# Patient Record
Sex: FEMALE | URBAN - METROPOLITAN AREA
[De-identification: names, ages, dates, MRNs, and addresses within clinical notes are randomized per-mention and may not be internally consistent; named-entity substitution may affect disease eponyms.]

---

## 2019-11-08 ENCOUNTER — TELEPHONE (OUTPATIENT)
Dept: PERINATAL CARE | Facility: CLINIC | Age: 34
End: 2019-11-08

## 2019-11-08 ENCOUNTER — TRANSCRIBE ORDERS (OUTPATIENT)
Dept: PERINATAL CARE | Facility: CLINIC | Age: 34
End: 2019-11-08

## 2019-11-08 DIAGNOSIS — O09.899 SUPERVISION OF OTHER HIGH RISK PREGNANCIES, UNSPECIFIED TRIMESTER: Primary | ICD-10-CM

## 2019-12-06 ENCOUNTER — ROUTINE PRENATAL (OUTPATIENT)
Dept: PERINATAL CARE | Facility: CLINIC | Age: 34
End: 2019-12-06
Payer: COMMERCIAL

## 2019-12-06 VITALS
DIASTOLIC BLOOD PRESSURE: 82 MMHG | BODY MASS INDEX: 43.07 KG/M2 | HEIGHT: 66 IN | WEIGHT: 268 LBS | SYSTOLIC BLOOD PRESSURE: 119 MMHG | HEART RATE: 77 BPM

## 2019-12-06 DIAGNOSIS — Z36.82 NUCHAL TRANSLUCENCY OF FETUS ON PRENATAL ULTRASOUND: ICD-10-CM

## 2019-12-06 DIAGNOSIS — O99.211 OBESITY AFFECTING PREGNANCY IN FIRST TRIMESTER: Primary | ICD-10-CM

## 2019-12-06 DIAGNOSIS — O09.899 SUPERVISION OF OTHER HIGH RISK PREGNANCIES, UNSPECIFIED TRIMESTER: ICD-10-CM

## 2019-12-06 DIAGNOSIS — Z3A.12 12 WEEKS GESTATION OF PREGNANCY: ICD-10-CM

## 2019-12-06 DIAGNOSIS — O09.291 PRIOR FETAL MACROSOMIA IN FIRST TRIMESTER, ANTEPARTUM: ICD-10-CM

## 2019-12-06 PROCEDURE — 99241 PR OFFICE CONSULTATION NEW/ESTAB PATIENT 15 MIN: CPT | Performed by: OBSTETRICS & GYNECOLOGY

## 2019-12-06 PROCEDURE — 76801 OB US < 14 WKS SINGLE FETUS: CPT | Performed by: OBSTETRICS & GYNECOLOGY

## 2019-12-06 PROCEDURE — 76813 OB US NUCHAL MEAS 1 GEST: CPT | Performed by: OBSTETRICS & GYNECOLOGY

## 2019-12-06 RX ORDER — DOXYCYCLINE HYCLATE 50 MG/1
324 CAPSULE, GELATIN COATED ORAL
COMMUNITY

## 2019-12-06 RX ORDER — ASPIRIN 81 MG/1
162 TABLET, CHEWABLE ORAL DAILY
Qty: 60 TABLET | Refills: 6
Start: 2019-12-06

## 2019-12-06 NOTE — PROGRESS NOTES
Patient is here today for an ultrasound for sequential screen  Risk factors include prior macrosomic baby and BMI greater than 40  Her ultrasound today appear normal   Sequential screen was drawn  We reviewed the risks of obesity in pregnancy  Recommend starting her on baby aspirin x2 to decrease her risk for preeclampsia  Recommend an early diabetes screen  Recommend a 28 and 34 week ultrasound for fetal growth which can be completed locally  Recommend a 20 week ultrasound for anatomy which can be completed locally  Recommend offering weekly NST/fluid scans from 36 weeks on as her BMI of greater than 40 increases her risk for stillbirth at term  Please see her ultrasound report in a separate report    Norm Berry MD

## 2019-12-06 NOTE — LETTER
December 9, 2019     Kendrick Headley DO  [address]    Patient: Brie Solomon   YOB: 1985   Date of Visit: 12/6/2019       Dear Dr Christian Melara: Thank you for referring Asaabdiel Hamman to me for evaluation  Below are my notes for this consultation  If you have questions, please do not hesitate to call me  I look forward to following your patient along with you  Sincerely,        Mary Mcgowan MD        CC: No Recipients  Mary Mcgowan MD  12/6/2019 11:51 AM  Sign at close encounter    Please see her ultrasound report in a separate report    Mary Mcgowan MD

## 2019-12-09 PROBLEM — O09.291 PRIOR FETAL MACROSOMIA IN FIRST TRIMESTER, ANTEPARTUM: Status: ACTIVE | Noted: 2019-12-09

## 2019-12-13 ENCOUNTER — DOCUMENTATION (OUTPATIENT)
Dept: PERINATAL CARE | Facility: CLINIC | Age: 34
End: 2019-12-13

## 2019-12-13 NOTE — PROGRESS NOTES
The patient viewed the message sent through [employer] by Dr Gisella Dinh that explained the results and part 2 instructions  TRF mailed to patient

## 2019-12-13 NOTE — LETTER
12/13/19  Xenia López Mt  1985    Thank you for completing Part 1 of your Sequential Screen  To obtain a complete test result, please complete blood work for Part 2 Sequential Screen between the weeks of 12/29/19 to 01/12/20  Based on your insurance coverage, please use one of the following locations  Call our office for any questions at [phone]      [address] [place] [address] Avenue  1492 [facility], [facility], [address]    300 [facility], [facility] [address]       Phone: [phone]      Phone: [phone]    [facility] 82  [facility] 6 [facility], 0 [facility], [address] [place] [address]  Phone: [phone]      Phone: [phone] (*ask for lab)    [address], [facility], [address] [place] [address], [place] [address] [address]  Phone: [phone]      Phone:  [phone]  Hours: Monday-Friday 6a-6p, Saturday 7a-12    [address]  [address]  Phone: [phone]      Phone:  793 [facility],[address] [facility]  207 [facility], [facility], [address]   [address]  Phone: [phone]      Phone: [phone]      Sincerely,    Freddie Avila RN

## 2020-01-10 ENCOUNTER — TELEPHONE (OUTPATIENT)
Dept: PERINATAL CARE | Facility: OTHER | Age: 35
End: 2020-01-10

## 2020-01-10 NOTE — TELEPHONE ENCOUNTER
Left message with result with phone number to call back at the number provided on communication consent

## 2020-01-10 NOTE — TELEPHONE ENCOUNTER
----- Message from Tricia Qureshi MD sent at 1/9/2020  9:18 PM EST -----  Patient updated with her lab results through my chart

## 2020-05-28 PROCEDURE — 87653 STREP B DNA AMP PROBE: CPT | Performed by: OBSTETRICS & GYNECOLOGY

## 2020-05-29 ENCOUNTER — LAB REQUISITION (OUTPATIENT)
Dept: LAB | Facility: HOSPITAL | Age: 35
End: 2020-05-29
Payer: COMMERCIAL

## 2020-05-29 DIAGNOSIS — O09.893 SUPERVISION OF OTHER HIGH RISK PREGNANCIES, THIRD TRIMESTER: ICD-10-CM

## 2020-05-31 LAB — GP B STREP DNA SPEC QL NAA+PROBE: NORMAL

## 2020-06-20 ENCOUNTER — ANESTHESIA (INPATIENT)
Dept: ANESTHESIOLOGY | Facility: HOSPITAL | Age: 35
End: 2020-06-20
Payer: COMMERCIAL

## 2020-06-20 ENCOUNTER — ANESTHESIA EVENT (INPATIENT)
Dept: ANESTHESIOLOGY | Facility: HOSPITAL | Age: 35
End: 2020-06-20
Payer: COMMERCIAL

## 2020-06-20 ENCOUNTER — HOSPITAL ENCOUNTER (INPATIENT)
Facility: HOSPITAL | Age: 35
LOS: 1 days | Discharge: HOME | End: 2020-06-21
Attending: OBSTETRICS & GYNECOLOGY | Admitting: OBSTETRICS & GYNECOLOGY
Payer: COMMERCIAL

## 2020-06-20 ENCOUNTER — HOSPITAL ENCOUNTER (OUTPATIENT)
Dept: LABOR AND DELIVERY | Facility: HOSPITAL | Age: 35
Discharge: HOME | End: 2020-06-20
Payer: COMMERCIAL

## 2020-06-20 PROBLEM — Z34.90 PREGNANT: Status: ACTIVE | Noted: 2019-11-07

## 2020-06-20 PROBLEM — Z78.9 SUSCEPTIBLE VARICELLA: Status: ACTIVE | Noted: 2019-11-25

## 2020-06-20 LAB
ABO GROUP BLD: NORMAL
BASE EXCESS BLDCOA CALC-SCNC: -2.8 MMOL/L (ref 3–11)
BASE EXCESS BLDCOV CALC-SCNC: -1.7 MMOL/L (ref 1–9)
BASOPHILS # BLD AUTO: 0.03 THOUSANDS/ΜL (ref 0–0.1)
BASOPHILS NFR BLD AUTO: 0 % (ref 0–1)
BLD GP AB SCN SERPL QL: NEGATIVE
CREAT UR-MCNC: 140.9 MG/DL
EOSINOPHIL # BLD AUTO: 0.11 THOUSAND/ΜL (ref 0–0.61)
EOSINOPHIL NFR BLD AUTO: 1 % (ref 0–6)
ERYTHROCYTE [DISTWIDTH] IN BLOOD BY AUTOMATED COUNT: 14.3 % (ref 11.6–15.1)
HCO3 BLDCOA-SCNC: 26.5 MMOL/L (ref 17.3–27.3)
HCO3 BLDCOV-SCNC: 25.2 MMOL/L (ref 12.2–28.6)
HCT VFR BLD AUTO: 35.7 % (ref 34.8–46.1)
HGB BLD-MCNC: 11.5 G/DL (ref 11.5–15.4)
IMM GRANULOCYTES # BLD AUTO: 0.04 THOUSAND/UL (ref 0–0.2)
IMM GRANULOCYTES NFR BLD AUTO: 0 % (ref 0–2)
LYMPHOCYTES # BLD AUTO: 2.21 THOUSANDS/ΜL (ref 0.6–4.47)
LYMPHOCYTES NFR BLD AUTO: 21 % (ref 14–44)
MCH RBC QN AUTO: 29.3 PG (ref 26.8–34.3)
MCHC RBC AUTO-ENTMCNC: 32.2 G/DL (ref 31.4–37.4)
MCV RBC AUTO: 91 FL (ref 82–98)
MONOCYTES # BLD AUTO: 0.57 THOUSAND/ΜL (ref 0.17–1.22)
MONOCYTES NFR BLD AUTO: 6 % (ref 4–12)
NEUTROPHILS # BLD AUTO: 7.37 THOUSANDS/ΜL (ref 1.85–7.62)
NEUTS SEG NFR BLD AUTO: 72 % (ref 43–75)
NRBC BLD AUTO-RTO: 0 /100 WBCS
O2 CT VFR BLDCOA CALC: 7 ML/DL
OXYHGB MFR BLDCOA: 32.8 %
OXYHGB MFR BLDCOV: 40.1 %
PCO2 BLDCOA: 65.7 MM[HG] (ref 30–60)
PCO2 BLDCOV: 50.9 MM HG (ref 27–43)
PH BLDCOA: 7.22 [PH] (ref 7.23–7.43)
PH BLDCOV: 7.31 [PH] (ref 7.19–7.49)
PLATELET # BLD AUTO: 226 THOUSANDS/UL (ref 149–390)
PMV BLD AUTO: 10.6 FL (ref 8.9–12.7)
PO2 BLDCOA: 19.9 MM HG (ref 5–25)
PO2 BLDCOV: 20 MM HG (ref 15–45)
PROT UR-MCNC: 43 MG/DL
PROT/CREAT UR: 0.31 MG/G{CREAT} (ref 0–0.1)
RBC # BLD AUTO: 3.92 MILLION/UL (ref 3.81–5.12)
RH BLD: POSITIVE
SAO2 % BLDCOV: 8.6 ML/DL
SPECIMEN EXPIRATION DATE: NORMAL
WBC # BLD AUTO: 10.33 THOUSAND/UL (ref 4.31–10.16)

## 2020-06-20 PROCEDURE — 10907ZC DRAINAGE OF AMNIOTIC FLUID, THERAPEUTIC FROM PRODUCTS OF CONCEPTION, VIA NATURAL OR ARTIFICIAL OPENING: ICD-10-PCS | Performed by: OBSTETRICS & GYNECOLOGY

## 2020-06-20 PROCEDURE — NC001 PR NO CHARGE: Performed by: OBSTETRICS & GYNECOLOGY

## 2020-06-20 PROCEDURE — 86592 SYPHILIS TEST NON-TREP QUAL: CPT | Performed by: OBSTETRICS & GYNECOLOGY

## 2020-06-20 PROCEDURE — 82570 ASSAY OF URINE CREATININE: CPT | Performed by: OBSTETRICS & GYNECOLOGY

## 2020-06-20 PROCEDURE — 4A1HXCZ MONITORING OF PRODUCTS OF CONCEPTION, CARDIAC RATE, EXTERNAL APPROACH: ICD-10-PCS | Performed by: OBSTETRICS & GYNECOLOGY

## 2020-06-20 PROCEDURE — 85025 COMPLETE CBC W/AUTO DIFF WBC: CPT | Performed by: OBSTETRICS & GYNECOLOGY

## 2020-06-20 PROCEDURE — 86850 RBC ANTIBODY SCREEN: CPT | Performed by: OBSTETRICS & GYNECOLOGY

## 2020-06-20 PROCEDURE — 3E033VJ INTRODUCTION OF OTHER HORMONE INTO PERIPHERAL VEIN, PERCUTANEOUS APPROACH: ICD-10-PCS | Performed by: OBSTETRICS & GYNECOLOGY

## 2020-06-20 PROCEDURE — 84156 ASSAY OF PROTEIN URINE: CPT | Performed by: OBSTETRICS & GYNECOLOGY

## 2020-06-20 PROCEDURE — 76815 OB US LIMITED FETUS(S): CPT | Performed by: OBSTETRICS & GYNECOLOGY

## 2020-06-20 PROCEDURE — 82805 BLOOD GASES W/O2 SATURATION: CPT | Performed by: OBSTETRICS & GYNECOLOGY

## 2020-06-20 PROCEDURE — 86900 BLOOD TYPING SEROLOGIC ABO: CPT | Performed by: OBSTETRICS & GYNECOLOGY

## 2020-06-20 PROCEDURE — 0UQMXZZ REPAIR VULVA, EXTERNAL APPROACH: ICD-10-PCS | Performed by: OBSTETRICS & GYNECOLOGY

## 2020-06-20 PROCEDURE — 86901 BLOOD TYPING SEROLOGIC RH(D): CPT | Performed by: OBSTETRICS & GYNECOLOGY

## 2020-06-20 RX ORDER — OXYCODONE HYDROCHLORIDE AND ACETAMINOPHEN 5; 325 MG/1; MG/1
2 TABLET ORAL EVERY 4 HOURS PRN
Status: DISCONTINUED | OUTPATIENT
Start: 2020-06-20 | End: 2020-06-21 | Stop reason: HOSPADM

## 2020-06-20 RX ORDER — SODIUM CHLORIDE, SODIUM LACTATE, POTASSIUM CHLORIDE, CALCIUM CHLORIDE 600; 310; 30; 20 MG/100ML; MG/100ML; MG/100ML; MG/100ML
125 INJECTION, SOLUTION INTRAVENOUS CONTINUOUS
Status: DISCONTINUED | OUTPATIENT
Start: 2020-06-20 | End: 2020-06-21 | Stop reason: HOSPADM

## 2020-06-20 RX ORDER — ONDANSETRON 2 MG/ML
4 INJECTION INTRAMUSCULAR; INTRAVENOUS EVERY 8 HOURS PRN
Status: DISCONTINUED | OUTPATIENT
Start: 2020-06-20 | End: 2020-06-21 | Stop reason: HOSPADM

## 2020-06-20 RX ORDER — OXYTOCIN/RINGER'S LACTATE 30/500 ML
250 PLASTIC BAG, INJECTION (ML) INTRAVENOUS CONTINUOUS
Status: DISPENSED | OUTPATIENT
Start: 2020-06-20 | End: 2020-06-20

## 2020-06-20 RX ORDER — OXYCODONE HYDROCHLORIDE AND ACETAMINOPHEN 5; 325 MG/1; MG/1
1 TABLET ORAL EVERY 4 HOURS PRN
Status: DISCONTINUED | OUTPATIENT
Start: 2020-06-20 | End: 2020-06-21 | Stop reason: HOSPADM

## 2020-06-20 RX ORDER — DIPHENHYDRAMINE HYDROCHLORIDE 50 MG/ML
25 INJECTION INTRAMUSCULAR; INTRAVENOUS EVERY 6 HOURS PRN
Status: DISCONTINUED | OUTPATIENT
Start: 2020-06-20 | End: 2020-06-21 | Stop reason: HOSPADM

## 2020-06-20 RX ORDER — LIDOCAINE HYDROCHLORIDE AND EPINEPHRINE 20; 5 MG/ML; UG/ML
INJECTION, SOLUTION EPIDURAL; INFILTRATION; INTRACAUDAL; PERINEURAL AS NEEDED
Status: DISCONTINUED | OUTPATIENT
Start: 2020-06-20 | End: 2020-06-20 | Stop reason: SURG

## 2020-06-20 RX ORDER — ROPIVACAINE HYDROCHLORIDE 2 MG/ML
INJECTION, SOLUTION EPIDURAL; INFILTRATION; PERINEURAL AS NEEDED
Status: DISCONTINUED | OUTPATIENT
Start: 2020-06-20 | End: 2020-06-20 | Stop reason: SURG

## 2020-06-20 RX ORDER — DOCUSATE SODIUM 100 MG/1
100 CAPSULE, LIQUID FILLED ORAL 2 TIMES DAILY
Status: DISCONTINUED | OUTPATIENT
Start: 2020-06-20 | End: 2020-06-21 | Stop reason: HOSPADM

## 2020-06-20 RX ORDER — OXYTOCIN/RINGER'S LACTATE 30/500 ML
1-30 PLASTIC BAG, INJECTION (ML) INTRAVENOUS
Status: DISCONTINUED | OUTPATIENT
Start: 2020-06-20 | End: 2020-06-21 | Stop reason: HOSPADM

## 2020-06-20 RX ORDER — IBUPROFEN 600 MG/1
600 TABLET ORAL EVERY 6 HOURS PRN
Status: DISCONTINUED | OUTPATIENT
Start: 2020-06-20 | End: 2020-06-21 | Stop reason: HOSPADM

## 2020-06-20 RX ORDER — ACETAMINOPHEN 325 MG/1
650 TABLET ORAL EVERY 6 HOURS PRN
Status: DISCONTINUED | OUTPATIENT
Start: 2020-06-20 | End: 2020-06-21 | Stop reason: HOSPADM

## 2020-06-20 RX ORDER — LIDOCAINE HYDROCHLORIDE AND EPINEPHRINE 15; 5 MG/ML; UG/ML
INJECTION, SOLUTION EPIDURAL
Status: COMPLETED | OUTPATIENT
Start: 2020-06-20 | End: 2020-06-20

## 2020-06-20 RX ORDER — CALCIUM CARBONATE 200(500)MG
1000 TABLET,CHEWABLE ORAL DAILY PRN
Status: DISCONTINUED | OUTPATIENT
Start: 2020-06-20 | End: 2020-06-21 | Stop reason: HOSPADM

## 2020-06-20 RX ORDER — ROPIVACAINE HYDROCHLORIDE 2 MG/ML
INJECTION, SOLUTION EPIDURAL; INFILTRATION; PERINEURAL CONTINUOUS PRN
Status: DISCONTINUED | OUTPATIENT
Start: 2020-06-20 | End: 2020-06-20 | Stop reason: SURG

## 2020-06-20 RX ORDER — ROPIVACAINE HYDROCHLORIDE 2 MG/ML
INJECTION, SOLUTION EPIDURAL; INFILTRATION; PERINEURAL
Status: COMPLETED
Start: 2020-06-20 | End: 2020-06-20

## 2020-06-20 RX ADMIN — DOCUSATE SODIUM 100 MG: 100 CAPSULE, LIQUID FILLED ORAL at 18:12

## 2020-06-20 RX ADMIN — ACETAMINOPHEN 650 MG: 325 TABLET ORAL at 23:10

## 2020-06-20 RX ADMIN — LIDOCAINE HYDROCHLORIDE AND EPINEPHRINE 3 ML: 15; 5 INJECTION, SOLUTION EPIDURAL at 13:03

## 2020-06-20 RX ADMIN — LIDOCAINE HYDROCHLORIDE,EPINEPHRINE BITARTRATE 3 ML: 20; .005 INJECTION, SOLUTION EPIDURAL; INFILTRATION; INTRACAUDAL; PERINEURAL at 15:08

## 2020-06-20 RX ADMIN — WITCH HAZEL 1 PAD: 500 SOLUTION RECTAL; TOPICAL at 20:58

## 2020-06-20 RX ADMIN — LIDOCAINE HYDROCHLORIDE AND EPINEPHRINE 3 ML: 15; 5 INJECTION, SOLUTION EPIDURAL at 14:01

## 2020-06-20 RX ADMIN — ROPIVACAINE HYDROCHLORIDE 4 ML: 2 INJECTION, SOLUTION EPIDURAL; INFILTRATION at 13:04

## 2020-06-20 RX ADMIN — BENZOCAINE AND LEVOMENTHOL: 200; 5 SPRAY TOPICAL at 20:58

## 2020-06-20 RX ADMIN — IBUPROFEN 600 MG: 600 TABLET ORAL at 18:12

## 2020-06-20 RX ADMIN — SODIUM CHLORIDE, SODIUM LACTATE, POTASSIUM CHLORIDE, AND CALCIUM CHLORIDE 125 ML/HR: .6; .31; .03; .02 INJECTION, SOLUTION INTRAVENOUS at 09:05

## 2020-06-20 RX ADMIN — Medication 2 MILLI-UNITS/MIN: at 09:07

## 2020-06-20 RX ADMIN — Medication 250 MILLI-UNITS/MIN: at 16:11

## 2020-06-20 RX ADMIN — ROPIVACAINE HYDROCHLORIDE 3 ML: 2 INJECTION, SOLUTION EPIDURAL; INFILTRATION at 13:06

## 2020-06-20 RX ADMIN — SODIUM CHLORIDE, SODIUM LACTATE, POTASSIUM CHLORIDE, AND CALCIUM CHLORIDE 125 ML/HR: .6; .31; .03; .02 INJECTION, SOLUTION INTRAVENOUS at 11:31

## 2020-06-20 RX ADMIN — ROPIVACAINE HYDROCHLORIDE 3 ML: 2 INJECTION, SOLUTION EPIDURAL; INFILTRATION at 13:08

## 2020-06-20 RX ADMIN — ROPIVACAINE HYDROCHLORIDE 8 ML/HR: 2 INJECTION, SOLUTION EPIDURAL; INFILTRATION at 13:09

## 2020-06-21 VITALS
BODY MASS INDEX: 43.07 KG/M2 | HEART RATE: 76 BPM | TEMPERATURE: 98.2 F | SYSTOLIC BLOOD PRESSURE: 102 MMHG | WEIGHT: 268 LBS | HEIGHT: 66 IN | RESPIRATION RATE: 18 BRPM | DIASTOLIC BLOOD PRESSURE: 75 MMHG

## 2020-06-21 PROCEDURE — NC001 PR NO CHARGE: Performed by: OBSTETRICS & GYNECOLOGY

## 2020-06-21 PROCEDURE — 90716 VAR VACCINE LIVE SUBQ: CPT | Performed by: OBSTETRICS & GYNECOLOGY

## 2020-06-21 RX ORDER — IBUPROFEN 600 MG/1
600 TABLET ORAL EVERY 6 HOURS PRN
Qty: 30 TABLET | Refills: 0 | Status: SHIPPED | OUTPATIENT
Start: 2020-06-21

## 2020-06-21 RX ORDER — ACETAMINOPHEN 325 MG/1
650 TABLET ORAL EVERY 6 HOURS PRN
Qty: 30 TABLET | Refills: 0 | Status: SHIPPED | OUTPATIENT
Start: 2020-06-21

## 2020-06-21 RX ADMIN — IBUPROFEN 600 MG: 600 TABLET ORAL at 13:34

## 2020-06-21 RX ADMIN — VARICELLA VIRUS VACCINE LIVE 0.5 ML: 1350 INJECTION, POWDER, LYOPHILIZED, FOR SUSPENSION SUBCUTANEOUS at 17:20

## 2020-06-21 RX ADMIN — DOCUSATE SODIUM 100 MG: 100 CAPSULE, LIQUID FILLED ORAL at 10:09

## 2020-06-21 RX ADMIN — ACETAMINOPHEN 650 MG: 325 TABLET ORAL at 16:52

## 2020-06-21 RX ADMIN — IBUPROFEN 600 MG: 600 TABLET ORAL at 06:41

## 2020-06-22 LAB — RPR SER QL: NORMAL

## 2020-06-30 LAB — PLACENTA IN STORAGE: NORMAL

## 2021-03-10 DIAGNOSIS — Z23 ENCOUNTER FOR IMMUNIZATION: ICD-10-CM

## 2023-05-04 ENCOUNTER — TELEPHONE (OUTPATIENT)
Facility: HOSPITAL | Age: 38
End: 2023-05-04

## 2023-05-04 ENCOUNTER — TRANSCRIBE ORDERS (OUTPATIENT)
Facility: HOSPITAL | Age: 38
End: 2023-05-04

## 2023-05-04 DIAGNOSIS — O09.899 SUPERVISION OF OTHER HIGH RISK PREGNANCY, ANTEPARTUM: Primary | ICD-10-CM

## 2023-05-04 NOTE — TELEPHONE ENCOUNTER
Called patient to schedule MFM appointment, based on referral issued to Maternal Fetal Medicine by [facility] office  Left voicemail requesting patient to call back and schedule appointment, with office number for return call [phone]

## 2023-05-04 NOTE — TELEPHONE ENCOUNTER
Spoke with PT 5/4 regarding scheduling appt's  PT stated that her DEBRA is different, new DEBRA is 9/27 and we used this date to schedule  PT wanted [place] office, and I offered one date at [place] but PT wasn't able to take it  I told PT that was the only appt at [address] within timeframe of 20-22 weeks  I offered [place] and PT stated she would call her  and call office back to schedule

## 2023-05-24 ENCOUNTER — ROUTINE PRENATAL (OUTPATIENT)
Dept: PERINATAL CARE | Facility: OTHER | Age: 38
End: 2023-05-24

## 2023-05-24 VITALS
HEART RATE: 85 BPM | SYSTOLIC BLOOD PRESSURE: 102 MMHG | DIASTOLIC BLOOD PRESSURE: 72 MMHG | WEIGHT: 277.8 LBS | HEIGHT: 67 IN | BODY MASS INDEX: 43.6 KG/M2

## 2023-05-24 DIAGNOSIS — O99.212 OBESITY AFFECTING PREGNANCY IN SECOND TRIMESTER: Primary | ICD-10-CM

## 2023-05-24 DIAGNOSIS — O09.899 SUPERVISION OF OTHER HIGH RISK PREGNANCY, ANTEPARTUM: ICD-10-CM

## 2023-05-24 DIAGNOSIS — Z36.86 ENCOUNTER FOR ANTENATAL SCREENING FOR CERVICAL LENGTH: ICD-10-CM

## 2023-05-24 DIAGNOSIS — Z3A.22 22 WEEKS GESTATION OF PREGNANCY: ICD-10-CM

## 2023-05-24 NOTE — PROGRESS NOTES
Ultrasound Probe Disinfection    A transvaginal ultrasound was performed  Prior to use, disinfection was performed with High Level Disinfection Process (Trophon)  Probe serial number U2: [id number] was used        Pilar Carter  05/24/23  1:14 PM

## 2023-05-24 NOTE — LETTER
May 24, 2023     [address]    Patient: Claudetta Barrs   YOB: 1985   Date of Visit: 5/24/2023       Dear Dr Fanny Frederick: Thank you for referring Drake Fernandez to me for evaluation  Below are my notes for this consultation  If you have questions, please do not hesitate to call me  I look forward to following your patient along with you  Sincerely,        Tommy Hatchet, MD        CC: No Recipients    Tommy Hatchet, MD  5/24/2023  3:00 PM  Sign when Signing Visit  Suzi Smith presents today for a fetal anatomic evaluation  This is her third pregnancy  She recognized she was pregnant at 17 weeks gestation  She has a history of 2 previous full-term vaginal deliveries without complications  She has a history of asthma not currently requiring medications  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 and not felt to be an issue during pregnancy  She has a history of Lasix and with severe contraction  She currently takes prenatal vitamins, loratadine, iron, and low-dose aspirin for preeclampsia mitigation  She has an allergy to amoxicillin  Substance use history is unremarkable  Family history is significant for her father with diabetes  A review of systems is otherwise negative  On exam today the patient appears well, in no acute distress, and denies any complaints  Her abdomen is non-tender  I reviewed the patient's aneuploidy screening  NIPT was reportedly normal     Today's ultrasound is limited by fetal position; therefore, the fetal anatomic survey could not be completed  No significant fetal abnormalities are appreciated or suspected  Good fetal movement and tone are seen  The amniotic fluid volume appears normal   A transvaginal ultrasound was performed to assess the cervix, which was cannot be accurately measured transabdominally    The cervical length was 4 8 centimeters, which is normal for the current gestational age and above the threshold in which intervention is generally recommended  There was no significant funneling or dynamic changes appreciated  She was informed of today's findings and all of her questions were answered  The implications of obesity and pregnancy are significant  The level of obesity is directly related to the risk of adverse pregnancy outcomes including but not limited to, risk of diabetes, hypertensive disorders of pregnancy, macrosomia, intrauterine growth restriction, labor and shoulder dystocias,  section, and increased risk of stillbirth  Recommend discussing the current weight gain recommendations for women with obesity and discussing good dietary practices as well as the safety of exercise in pregnancy  I recommend the patient gain no more than 11-20 pounds throughout her entire pregnancy, increase her exercise and follow healthy dietary habits  Consider referral to a dietitian should the patient have difficulty following the aforementioned recommendations  Recommend third trimester growth ultrasounds to screen for fetal growth problems as well as ensuring the patient is appropriately screened for pregestational and gestational diabetes  Additional  surveillance is recommended starting at 36 weeks in those women with a BMI of greater than 40 given the increased risk for stillbirth  We discussed follow-up in detail and I recommend she return in 6 weeks to our Center in order to complete the fetal anatomic survey and to assess fetal growth  Thank you very much for allowing us to participate in the care of this very nice patient  Should you have any questions, please do not hesitate to contact me

## 2023-05-24 NOTE — PROGRESS NOTES
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 and not felt to be an issue during pregnancy  She has a history of Lasix and with severe contraction  She currently takes prenatal vitamins, loratadine, iron, and low-dose aspirin for preeclampsia mitigation  She has an allergy to amoxicillin  Substance use history is unremarkable  Family history is significant for her father with diabetes  A review of systems is otherwise negative  On exam today the patient appears well, in no acute distress, and denies any complaints  Her abdomen is non-tender  I reviewed the patient's aneuploidy screening  NIPT was reportedly normal     Today's ultrasound is limited by fetal position; therefore, the fetal anatomic survey could not be completed  No significant fetal abnormalities are appreciated or suspected  Good fetal movement and tone are seen  The amniotic fluid volume appears normal   A transvaginal ultrasound was performed to assess the cervix, which was cannot be accurately measured transabdominally  The cervical length was 4 8 centimeters, which is normal for the current gestational age and above the threshold in which intervention is generally recommended  There was no significant funneling or dynamic changes appreciated  She was informed of today's findings and all of her questions were answered  The implications of obesity and pregnancy are significant    The level of obesity is directly related to the risk of adverse pregnancy outcomes including but not limited to, risk of diabetes, hypertensive disorders of pregnancy, macrosomia, intrauterine growth restriction, labor and shoulder dystocias,  section, and increased risk of stillbirth  Recommend discussing the current weight gain recommendations for women with obesity and discussing good dietary practices as well as the safety of exercise in pregnancy  I recommend the patient gain no more than 11-20 pounds throughout her entire pregnancy, increase her exercise and follow healthy dietary habits  Consider referral to a dietitian should the patient have difficulty following the aforementioned recommendations  Recommend third trimester growth ultrasounds to screen for fetal growth problems as well as ensuring the patient is appropriately screened for pregestational and gestational diabetes  Additional  surveillance is recommended starting at 36 weeks in those women with a BMI of greater than 40 given the increased risk for stillbirth  We discussed follow-up in detail and I recommend she return in 6 weeks to our Center in order to complete the fetal anatomic survey and to assess fetal growth  Thank you very much for allowing us to participate in the care of this very nice patient  Should you have any questions, please do not hesitate to contact me

## 2023-07-03 ENCOUNTER — ULTRASOUND (OUTPATIENT)
Dept: PERINATAL CARE | Facility: CLINIC | Age: 38
End: 2023-07-03
Payer: COMMERCIAL

## 2023-07-03 VITALS
DIASTOLIC BLOOD PRESSURE: 60 MMHG | HEART RATE: 95 BPM | HEIGHT: 67 IN | WEIGHT: 280.4 LBS | BODY MASS INDEX: 44.01 KG/M2 | SYSTOLIC BLOOD PRESSURE: 100 MMHG

## 2023-07-03 DIAGNOSIS — Z36.89 ENCOUNTER FOR ULTRASOUND TO CHECK FETAL GROWTH: ICD-10-CM

## 2023-07-03 DIAGNOSIS — Z36.2 ENCOUNTER FOR FOLLOW-UP ULTRASOUND OF FETAL ANATOMY: ICD-10-CM

## 2023-07-03 DIAGNOSIS — O99.212 OBESITY AFFECTING PREGNANCY IN SECOND TRIMESTER: Primary | ICD-10-CM

## 2023-07-03 PROCEDURE — 76816 OB US FOLLOW-UP PER FETUS: CPT | Performed by: OBSTETRICS & GYNECOLOGY

## 2023-07-03 PROCEDURE — 99213 OFFICE O/P EST LOW 20 MIN: CPT | Performed by: OBSTETRICS & GYNECOLOGY

## 2023-07-03 NOTE — LETTER
7/3/2023    [address]  1100 [facility]    Patient: Ana María Crowder   YOB: 1985   Date of Visit: 7/3/2023   Josseline Amaya of this communication: Routine       Dear Tash Singh,    This patient was seen recently in our  office. Consultation is contained in body of ultrasound report which has been faxed to you under separate cover; please contact us if you do not receive this. Please don't hesitate to contact our office with any concerns or questions.      Sincerely,      Hortencia Cline MD  Attending Physician, [address] [place] [address]

## 2023-07-03 NOTE — PATIENT INSTRUCTIONS
Thank you for choosing us for your  care today. If you have any questions about your ultrasound or care, please do not hesitate to contact us or your primary obstetrician. Some general instructions for your pregnancy are:    Exercise: Aim for 22 minutes per day (150 minutes per week) of regular exercise. Walking is great! Nutrition: Choose healthy sources of calcium, iron, and protein. Learn about Preeclampsia: preeclampsia is a common, serious high blood pressure complication in pregnancy. A blood pressure of 862HMPF (systolic or top number) or 75PNNG (diastolic or bottom number) is not normal and needs evaluation by your doctor. Aspirin is sometimes prescribed in early pregnancy to prevent preeclampsia in women with risk factors - ask your obstetrician if you should be on this medication. For more resources, visit:  MapCoverage.fi  If you smoke, try to reduce how many cigarettes you smoke or try to quit completely. Do not vape. Other warning signs to watch out for in pregnancy or postpartum: chest pain, obstructed breathing or shortness of breath, seizures, thoughts of hurting yourself or your baby, bleeding, a painful or swollen leg, fever, or headache (see [employer] POST-BIRTH Warning Signs campaign). If these happen call 911. Itching is also not normal in pregnancy and if you experience this, especially over your hands and feet, potentially worse at night, notify your doctors.

## 2023-09-06 ENCOUNTER — LAB REQUISITION (OUTPATIENT)
Dept: LAB | Facility: HOSPITAL | Age: 38
End: 2023-09-06
Payer: COMMERCIAL

## 2023-09-06 DIAGNOSIS — Z36.85 ENCOUNTER FOR ANTENATAL SCREENING FOR STREPTOCOCCUS B: ICD-10-CM

## 2023-09-06 PROCEDURE — 87150 DNA/RNA AMPLIFIED PROBE: CPT | Performed by: OBSTETRICS & GYNECOLOGY

## 2023-09-08 LAB — GP B STREP DNA SPEC QL NAA+PROBE: NEGATIVE

## 2023-09-22 ENCOUNTER — HOSPITAL ENCOUNTER (OUTPATIENT)
Facility: HOSPITAL | Age: 38
Discharge: HOME | End: 2023-09-22
Attending: OBSTETRICS & GYNECOLOGY | Admitting: OBSTETRICS & GYNECOLOGY
Payer: COMMERCIAL

## 2023-09-22 ENCOUNTER — HOSPITAL ENCOUNTER (INPATIENT)
Facility: HOSPITAL | Age: 38
LOS: 1 days | Discharge: HOME | End: 2023-09-24
Attending: OBSTETRICS & GYNECOLOGY | Admitting: OBSTETRICS & GYNECOLOGY
Payer: COMMERCIAL

## 2023-09-22 VITALS — SYSTOLIC BLOOD PRESSURE: 136 MMHG | HEART RATE: 84 BPM | TEMPERATURE: 98 F | DIASTOLIC BLOOD PRESSURE: 69 MMHG

## 2023-09-22 DIAGNOSIS — Z3A.39 39 WEEKS GESTATION OF PREGNANCY: Primary | ICD-10-CM

## 2023-09-22 DIAGNOSIS — O47.9 UTERINE CONTRACTIONS: Primary | ICD-10-CM

## 2023-09-22 LAB — A1 MICROGLOB PLACENTAL VAG QL: NEGATIVE

## 2023-09-22 PROCEDURE — 76815 OB US LIMITED FETUS(S): CPT

## 2023-09-22 PROCEDURE — 99213 OFFICE O/P EST LOW 20 MIN: CPT

## 2023-09-22 PROCEDURE — NC001 PR NO CHARGE: Performed by: OBSTETRICS & GYNECOLOGY

## 2023-09-22 PROCEDURE — 59025 FETAL NON-STRESS TEST: CPT

## 2023-09-22 PROCEDURE — G0463 HOSPITAL OUTPT CLINIC VISIT: HCPCS

## 2023-09-22 PROCEDURE — 84112 EVAL AMNIOTIC FLUID PROTEIN: CPT | Performed by: OBSTETRICS & GYNECOLOGY

## 2023-09-22 RX ORDER — ACETAMINOPHEN 325 MG/1
650 TABLET ORAL EVERY 6 HOURS PRN
Refills: 0
Start: 2023-09-22

## 2023-09-22 NOTE — PROGRESS NOTES
L&D Triage Note - OB/GYN  Xenia Silverman 41 y.o. female MRN: [id number]  Unit/Bed#: L&D 321-01 Encounter: [id number]        Patient is seen by Everardo Omalley OB/[facility]    ASSESSMENT/PLAN  Xenia Silverman is a 41 y.o.  at 39w2d here for rule out rupture, rule out labor. Work up negative. Two hour recheck with minimal change, 2/50/-3 -> 3.5/50/-3k. Likely early labor vs norma díaz. Plan to return  for induction, or sooner if return precautions met. Return precautions discussed: Rest of fluid, vaginal bleeding, decreased fetal movement, contractions less than 5 minutes apart for 2 hours. Patient stable for discharge home. 1) Rule out rupture   - Speculum exam: Normal-appearing external female genitalia without lesion, normal-appearing vagina and cervix without lesion, physiologic discharge noted within the vagina, negative pooling, negative vaginal bleeding  - Amnisure: negative  - Membrane status   - neg ferning   - neg nitrazene   - neg pooling     SVE:  Cervical Dilation: 3-4  Cervical Effacement: 50  Cervical Consistency: Medium  Fetal Station: -3  Presentation: Vertex  Method: Manual  OB Examiner: javier    FHT:  Baseline Rate: 145 bpm  Variability: Moderate 6-25 bpm  Accelerations: 15 x 15 or greater  Decelerations: None  FHR Category: Category I    TOCO:   Contraction Frequency (minutes): 7  Contraction Duration (seconds): 60  Contraction Quality: Mild    IMAGING:      TAUS   BRITTNEY      - Q1 2.0 cm     - Q2 1.8 cm     - Q3 2.9 cm     - Q4 3.3 cm     - Total: 10.0 cm   Placenta: anterior   Presentation: cephalic     2)  Discharge instructions  - Patient instructed to call if experiencing worsening contractions, vaginal bleeding, loss of fluid or decreased fetal movement. - Will follow up with OBGYN in office    D/w Dr. Ally Katz  ______________    SUBJECTIVE    DEBRA: Estimated Date of Delivery: 23    HPI:  41 y.o. [id number] 39w2d presents with complaint of leakage of fluid and contractions.  She noticed her pants got suddenly wet around 3:00 am and had continued leaking since. Contractions started around 1:00 am. They are now 4-5 minutes apart. No other complaints at this time. Contractions: neg  Leakage of fluid: gush at 3:00 am with continued leaking  Vaginal Bleeding: neg  Fetal movement: present    Her obstetrical history is significant for 2 term vaginal deliveries in 2016 and 2020. She is proven to 9lb 9 oz. ROS:  Constitutional: Negative  Respiratory: Negative  Cardiovascular: Negative    Gastrointestinal: Negative    Physical Exam  GEN: Well appearing, no apparent distress   ABD: Gravid, soft  SVE: Cervical Dilation: 3-4  Cervical Effacement: 50  Cervical Consistency: Medium  Fetal Station: -3  Presentation: Vertex  Method: Manual  OB Examiner: javier    OBJECTIVE:  /69   Pulse 84   Temp 98 °F (36.7 °C) (Oral)   There is no height or weight on file to calculate BMI. Labs:   Recent Results (from the past 24 hour(s))   Placental Alpha-1 Microglobulin    Collection Time: 09/22/23  8:46 AM   Result Value Ref Range    PLACENTAL ALPHA-1 MICROGLOBULIN Negative          Vangie Paul MD  OB/GYN PGY-1  9/22/2023  11:38 AM      Portions of the record may have been created with voice recognition software. Occasional wrong word or "sound a like" substitutions may have occurred due to the inherent limitations of voice recognition software.   Read the chart carefully and recognize, using context, where substitutions have occurred

## 2023-09-22 NOTE — PROCEDURES
Xenia Beltran Chi, a [id number] at 39w2d with an DEBRA of 9/27/2023, by Ultrasound, was seen at [address] for the following procedure(s): $Procedure Type: BRITTNEY, NST, US - abdominal]    Nonstress Test  Variability: Moderate  Decelerations: None  Accelerations: Yes  Acoustic Stimulator: No  Uterine Irritability: No  Contractions: Irregular  Contraction Frequency (minutes): 7.5 min (cx originally 4-5, spaced to 7-8 minutes at time of discharge)    4 Quadrant BRITTNEY  BRITTNEY Q1 (cm): 3.3 cm  BRITTNEY Q2 (cm): 2.9 cm  BRITTNEY Q3 (cm): 1.8 cm  BRITTNEY Q4 (cm): 2 cm  BRITTNEY TOTAL (cm): 10 cm  LVP (cm): 3.3 cm                Interpretation  Nonstress Test Interpretation: Reactive  Overall Impression: Reassuring

## 2023-09-23 ENCOUNTER — ANESTHESIA (INPATIENT)
Dept: ANESTHESIOLOGY | Facility: HOSPITAL | Age: 38
End: 2023-09-23
Payer: COMMERCIAL

## 2023-09-23 ENCOUNTER — ANESTHESIA EVENT (INPATIENT)
Dept: ANESTHESIOLOGY | Facility: HOSPITAL | Age: 38
End: 2023-09-23
Payer: COMMERCIAL

## 2023-09-23 PROBLEM — O14.90 PREECLAMPSIA: Status: ACTIVE | Noted: 2023-09-23

## 2023-09-23 PROBLEM — R03.0 ELEVATED BLOOD PRESSURE READING WITHOUT DIAGNOSIS OF HYPERTENSION: Status: ACTIVE | Noted: 2023-09-23

## 2023-09-23 LAB
ABO GROUP BLD: NORMAL
ALBUMIN SERPL BCP-MCNC: 3.2 G/DL (ref 3.5–5)
ALP SERPL-CCNC: 129 U/L (ref 34–104)
ALT SERPL W P-5'-P-CCNC: 11 U/L (ref 7–52)
ANION GAP SERPL CALCULATED.3IONS-SCNC: 7 MMOL/L
AST SERPL W P-5'-P-CCNC: 12 U/L (ref 13–39)
BASE EXCESS BLDCOA CALC-SCNC: -5.3 MMOL/L (ref 3–11)
BASE EXCESS BLDCOV CALC-SCNC: -1 MMOL/L (ref 1–9)
BILIRUB SERPL-MCNC: 0.33 MG/DL (ref 0.2–1)
BLD GP AB SCN SERPL QL: NEGATIVE
BUN SERPL-MCNC: 11 MG/DL (ref 5–25)
CALCIUM ALBUM COR SERPL-MCNC: 9.3 MG/DL (ref 8.3–10.1)
CALCIUM SERPL-MCNC: 8.7 MG/DL (ref 8.4–10.2)
CHLORIDE SERPL-SCNC: 105 MMOL/L (ref 96–108)
CO2 SERPL-SCNC: 23 MMOL/L (ref 21–32)
CREAT SERPL-MCNC: 0.48 MG/DL (ref 0.6–1.3)
CREAT UR-MCNC: 174.3 MG/DL
ERYTHROCYTE [DISTWIDTH] IN BLOOD BY AUTOMATED COUNT: 14.6 % (ref 11.6–15.1)
GFR SERPL CREATININE-BSD FRML MDRD: 125 ML/MIN/1.73SQ M
GLUCOSE SERPL-MCNC: 119 MG/DL (ref 65–140)
HCO3 BLDCOA-SCNC: 25 MMOL/L (ref 17.3–27.3)
HCO3 BLDCOV-SCNC: 24.6 MMOL/L (ref 12.2–28.6)
HCT VFR BLD AUTO: 34.3 % (ref 34.8–46.1)
HGB BLD-MCNC: 10.9 G/DL (ref 11.5–15.4)
MCH RBC QN AUTO: 28.3 PG (ref 26.8–34.3)
MCHC RBC AUTO-ENTMCNC: 31.8 G/DL (ref 31.4–37.4)
MCV RBC AUTO: 89 FL (ref 82–98)
O2 CT VFR BLDCOA CALC: 9.6 ML/DL
OXYHGB MFR BLDCOA: 47.3 %
OXYHGB MFR BLDCOV: 74 %
PCO2 BLDCOA: 72.5 MM[HG] (ref 30–60)
PCO2 BLDCOV: 44.5 MM HG (ref 27–43)
PH BLDCOA: 7.16 [PH] (ref 7.23–7.43)
PH BLDCOV: 7.36 [PH] (ref 7.19–7.49)
PLATELET # BLD AUTO: 196 THOUSANDS/UL (ref 149–390)
PMV BLD AUTO: 11.2 FL (ref 8.9–12.7)
PO2 BLDCOA: 24.2 MM HG (ref 5–25)
PO2 BLDCOV: 32.2 MM HG (ref 15–45)
POTASSIUM SERPL-SCNC: 3.9 MMOL/L (ref 3.5–5.3)
PROT SERPL-MCNC: 6 G/DL (ref 6.4–8.4)
PROT UR-MCNC: 112 MG/DL
PROT/CREAT UR: 0.64 MG/G{CREAT} (ref 0–0.1)
RBC # BLD AUTO: 3.85 MILLION/UL (ref 3.81–5.12)
RH BLD: POSITIVE
SAO2 % BLDCOV: 14.7 ML/DL
SODIUM SERPL-SCNC: 135 MMOL/L (ref 135–147)
SPECIMEN EXPIRATION DATE: NORMAL
TREPONEMA PALLIDUM IGG+IGM AB [PRESENCE] IN SERUM OR PLASMA BY IMMUNOASSAY: NORMAL
WBC # BLD AUTO: 12.41 THOUSAND/UL (ref 4.31–10.16)

## 2023-09-23 PROCEDURE — 86900 BLOOD TYPING SEROLOGIC ABO: CPT

## 2023-09-23 PROCEDURE — 85027 COMPLETE CBC AUTOMATED: CPT

## 2023-09-23 PROCEDURE — 99212 OFFICE O/P EST SF 10 MIN: CPT

## 2023-09-23 PROCEDURE — G0463 HOSPITAL OUTPT CLINIC VISIT: HCPCS

## 2023-09-23 PROCEDURE — 88307 TISSUE EXAM BY PATHOLOGIST: CPT | Performed by: PATHOLOGY

## 2023-09-23 PROCEDURE — 86780 TREPONEMA PALLIDUM: CPT

## 2023-09-23 PROCEDURE — 82805 BLOOD GASES W/O2 SATURATION: CPT | Performed by: OBSTETRICS & GYNECOLOGY

## 2023-09-23 PROCEDURE — 4A1HXCZ MONITORING OF PRODUCTS OF CONCEPTION, CARDIAC RATE, EXTERNAL APPROACH: ICD-10-PCS | Performed by: OBSTETRICS & GYNECOLOGY

## 2023-09-23 PROCEDURE — 80053 COMPREHEN METABOLIC PANEL: CPT

## 2023-09-23 PROCEDURE — 3E0R3GC INTRODUCTION OF OTHER THERAPEUTIC SUBSTANCE INTO SPINAL CANAL, PERCUTANEOUS APPROACH: ICD-10-PCS | Performed by: OBSTETRICS & GYNECOLOGY

## 2023-09-23 PROCEDURE — NC001 PR NO CHARGE: Performed by: OBSTETRICS & GYNECOLOGY

## 2023-09-23 PROCEDURE — 86850 RBC ANTIBODY SCREEN: CPT

## 2023-09-23 PROCEDURE — 82570 ASSAY OF URINE CREATININE: CPT

## 2023-09-23 PROCEDURE — 84156 ASSAY OF PROTEIN URINE: CPT

## 2023-09-23 PROCEDURE — 86901 BLOOD TYPING SEROLOGIC RH(D): CPT

## 2023-09-23 PROCEDURE — 10907ZC DRAINAGE OF AMNIOTIC FLUID, THERAPEUTIC FROM PRODUCTS OF CONCEPTION, VIA NATURAL OR ARTIFICIAL OPENING: ICD-10-PCS | Performed by: OBSTETRICS & GYNECOLOGY

## 2023-09-23 RX ORDER — ONDANSETRON 2 MG/ML
4 INJECTION INTRAMUSCULAR; INTRAVENOUS EVERY 8 HOURS PRN
Status: DISCONTINUED | OUTPATIENT
Start: 2023-09-23 | End: 2023-09-24 | Stop reason: HOSPADM

## 2023-09-23 RX ORDER — IBUPROFEN 600 MG/1
600 TABLET ORAL EVERY 6 HOURS
Status: DISCONTINUED | OUTPATIENT
Start: 2023-09-23 | End: 2023-09-24 | Stop reason: HOSPADM

## 2023-09-23 RX ORDER — OXYTOCIN/RINGER'S LACTATE 30/500 ML
PLASTIC BAG, INJECTION (ML) INTRAVENOUS
Status: COMPLETED
Start: 2023-09-23 | End: 2023-09-23

## 2023-09-23 RX ORDER — CALCIUM CARBONATE 500 MG/1
1000 TABLET, CHEWABLE ORAL DAILY PRN
Status: DISCONTINUED | OUTPATIENT
Start: 2023-09-23 | End: 2023-09-24 | Stop reason: HOSPADM

## 2023-09-23 RX ORDER — SODIUM CHLORIDE, SODIUM LACTATE, POTASSIUM CHLORIDE, CALCIUM CHLORIDE 600; 310; 30; 20 MG/100ML; MG/100ML; MG/100ML; MG/100ML
125 INJECTION, SOLUTION INTRAVENOUS CONTINUOUS
Status: DISCONTINUED | OUTPATIENT
Start: 2023-09-23 | End: 2023-09-23

## 2023-09-23 RX ORDER — BUPIVACAINE HYDROCHLORIDE 2.5 MG/ML
30 INJECTION, SOLUTION EPIDURAL; INFILTRATION; INTRACAUDAL ONCE AS NEEDED
Status: DISCONTINUED | OUTPATIENT
Start: 2023-09-23 | End: 2023-09-23

## 2023-09-23 RX ORDER — DOCUSATE SODIUM 100 MG/1
100 CAPSULE, LIQUID FILLED ORAL 2 TIMES DAILY
Status: DISCONTINUED | OUTPATIENT
Start: 2023-09-23 | End: 2023-09-24 | Stop reason: HOSPADM

## 2023-09-23 RX ORDER — ROPIVACAINE HYDROCHLORIDE 2 MG/ML
INJECTION, SOLUTION EPIDURAL; INFILTRATION; PERINEURAL
Status: COMPLETED | OUTPATIENT
Start: 2023-09-23 | End: 2023-09-23

## 2023-09-23 RX ORDER — DIAPER,BRIEF,INFANT-TODD,DISP
1 EACH MISCELLANEOUS DAILY PRN
Status: DISCONTINUED | OUTPATIENT
Start: 2023-09-23 | End: 2023-09-24 | Stop reason: HOSPADM

## 2023-09-23 RX ORDER — ACETAMINOPHEN 325 MG/1
650 TABLET ORAL EVERY 4 HOURS PRN
Status: DISCONTINUED | OUTPATIENT
Start: 2023-09-23 | End: 2023-09-24 | Stop reason: HOSPADM

## 2023-09-23 RX ORDER — ONDANSETRON 2 MG/ML
4 INJECTION INTRAMUSCULAR; INTRAVENOUS EVERY 6 HOURS PRN
Status: DISCONTINUED | OUTPATIENT
Start: 2023-09-23 | End: 2023-09-23

## 2023-09-23 RX ORDER — OXYTOCIN/RINGER'S LACTATE 30/500 ML
1-30 PLASTIC BAG, INJECTION (ML) INTRAVENOUS
Status: DISCONTINUED | OUTPATIENT
Start: 2023-09-23 | End: 2023-09-23

## 2023-09-23 RX ORDER — OXYTOCIN/RINGER'S LACTATE 30/500 ML
250 PLASTIC BAG, INJECTION (ML) INTRAVENOUS ONCE
Status: DISCONTINUED | OUTPATIENT
Start: 2023-09-23 | End: 2023-09-24 | Stop reason: HOSPADM

## 2023-09-23 RX ORDER — DIPHENHYDRAMINE HYDROCHLORIDE 50 MG/ML
12.5 INJECTION INTRAMUSCULAR; INTRAVENOUS EVERY 6 HOURS PRN
Status: DISCONTINUED | OUTPATIENT
Start: 2023-09-23 | End: 2023-09-23

## 2023-09-23 RX ADMIN — SODIUM CHLORIDE, SODIUM LACTATE, POTASSIUM CHLORIDE, AND CALCIUM CHLORIDE 999 ML/HR: .6; .31; .03; .02 INJECTION, SOLUTION INTRAVENOUS at 05:52

## 2023-09-23 RX ADMIN — BENZOCAINE AND LEVOMENTHOL 1 APPLICATION: 200; 5 SPRAY TOPICAL at 15:25

## 2023-09-23 RX ADMIN — ROPIVACAINE HYDROCHLORIDE 10 ML: 2 INJECTION, SOLUTION EPIDURAL; INFILTRATION at 06:40

## 2023-09-23 RX ADMIN — ACETAMINOPHEN 325MG 650 MG: 325 TABLET ORAL at 22:45

## 2023-09-23 RX ADMIN — WITCH HAZEL 1 PAD: 500 SOLUTION RECTAL; TOPICAL at 15:26

## 2023-09-23 RX ADMIN — DOCUSATE SODIUM 100 MG: 100 CAPSULE, LIQUID FILLED ORAL at 18:16

## 2023-09-23 RX ADMIN — IBUPROFEN 600 MG: 600 TABLET ORAL at 18:16

## 2023-09-23 RX ADMIN — ROPIVACAINE HYDROCHLORIDE 10 ML/HR: 2 INJECTION, SOLUTION EPIDURAL; INFILTRATION at 06:47

## 2023-09-23 RX ADMIN — Medication 2 MILLI-UNITS/MIN: at 05:52

## 2023-09-23 RX ADMIN — ONDANSETRON 4 MG: 2 INJECTION INTRAMUSCULAR; INTRAVENOUS at 07:08

## 2023-09-23 RX ADMIN — ACETAMINOPHEN 325MG 650 MG: 325 TABLET ORAL at 15:30

## 2023-09-23 NOTE — OB LABOR/OXYTOCIN SAFETY PROGRESS
Oxytocin Safety Progress Check Note - Xenia Merrill 41 y.o. female MRN: [id number]    Unit/Bed#: L&D 323-01 Encounter: [id number]    Dose (india-units/min) Oxytocin: 20 india-units/min  Contraction Frequency (minutes): 1-5  Contraction Quality: Moderate  Tachysystole: No   Cervical Dilation: 5        Cervical Effacement: 80  Fetal Station: -1  Baseline Rate: 130 bpm  Fetal Heart Rate: 135 BPM  FHR Category: Cat I               Vital Signs:  Vitals:    09/23/23 0914   BP: 133/63   Pulse: 93   Resp:    Temp:        Notes/comments:   Patient is making cervical change. Will recheck in 1h or earlier if is feeling constant pressure. Cat I tracing.  SVE performed by Dr Kathy Messer MD 9/23/2023 10:01 AM

## 2023-09-23 NOTE — ANESTHESIA PROCEDURE NOTES
Epidural Block    Patient location during procedure: OB/L&D  Start time: 9/23/2023 6:40 AM  Reason for block: at surgeon's request  Staffing  Performed by: Nichole Huang DO  Authorized by: Nichole Huang DO    Preanesthetic Checklist  Completed: patient identified, IV checked, site marked, risks and benefits discussed, surgical consent, monitors and equipment checked, pre-op evaluation and timeout performed  Epidural  Patient position: sitting  Prep: Betadine  Sedation Level: no sedation  Patient monitoring: cardiac monitor and heart rate  Approach: midline  Location: lumbar, L3-4  Injection technique: STEPHIE air  Needle  Needle type: Tuohy   Needle gauge: 18 G  Catheter type: side hole  Catheter size: 20 G  Test dose: negativeropivacaine (NAROPIN) 0.2% injection 10 mL - Epidural   10 mL - 9/23/2023 6:40:00 AM  Assessment  Sensory level: T10  Number of attempts: 2negative aspiration for CSF, negative aspiration for heme and no paresthesia on injection  patient tolerated the procedure well with no immediate complications

## 2023-09-23 NOTE — OB LABOR/OXYTOCIN SAFETY PROGRESS
Oxytocin Safety Progress Check Note - Xenia Giron 41 y.o. female MRN: [id number]    Unit/Bed#: L&D 323-01 Encounter: [id number]    Dose (india-units/min) Oxytocin: 24 india-units/min  Contraction Frequency (minutes): 1-3  Contraction Quality: Moderate  Tachysystole: No   Cervical Dilation: 8-9        Cervical Effacement: 100  Fetal Station: 0  Baseline Rate: 135 bpm  Fetal Heart Rate: 135 BPM  FHR Category: Cat 1           Vital Signs:   Vitals:    09/23/23 1029   BP: 125/69   Pulse: 100   Resp:    Temp:        Notes/comments: Feeling more pressure. Expectant mgmt.         Elysia Sosa DO 9/23/2023 11:27 AM

## 2023-09-23 NOTE — DISCHARGE SUMMARY
Discharge Summary - Xenia Bill 41 y.o. female MRN: [id number]    Unit/Bed#: L&D 323-01 Encounter: [id number]    Admission Date: 2023     Discharge Date: 2023    Admitting Diagnosis:   1. IOL for PreE w/o SF  2. Obesity     Discharge Diagnosis:   Same, delivered    Procedures:   spontaneous vaginal delivery    Admitting Attending: Dr. Ambrocio Ellis  Delivery Attending: Dr. Carol Hdez DO   Discharge Attending: Dr. Carol Hdez DO    Hospital Course:     Jazmin Lee is a 41 y.o. [id number] who was admitted at 39w2d for IOL in the setting of PreE w/o SF. She was started on pitocin. She made continuous cervical change and received an epidural for pain control. She was AROM'd for clear moderate fluid. She proceeded to make cervical change and became complete and started pushing. She then underwent an uncomplicated spontaneous vaginal delivery and delivered a viable male  at 200. APGARS were 9, 9 at 1 and 5 minutes, respectively.  weighed 9lb 2oz. Placenta was delivered thereafter.  was then transferred to  nursery. Patient tolerated the procedure well and was transferred to recovery in stable condition. The patient's post partum course was unremarkable. On day of discharge, she was ambulating and able to reasonably perform all ADLs. She was voiding and had appropriate bowel function. Pain was well controlled. She was discharged home on postpartum day #1 without complications. Patient was instructed to follow up with her OB as an outpatient and was given appropriate warnings to call provider if she develops signs of infection or uncontrolled pain. On day of discharge she was ambulating, voiding spontaneously, tolerating oral intake and hemodynamically stable. She is bottle feeding . Mom's blood type is B positiveRhoGAM is not indicated .   Condition at discharge:   stable     Disposition:   Home    Planned Readmission:   No    Discharge Medications: Prenatal vitamin daily for 6 months or the duration of nursing whichever is longer. Motrin 600 mg orally every 6 hours as needed for pain  Tylenol (over the counter) per bottle directions as needed for pain  Hydrocortisone cream 1% (over the counter) applied 1-2x daily to hemorrhoids as needed  Witch hazel pads for hemorrhoidal discomfort as needed      Discharge instructions :   -Do not place anything (no partner, tampons or douche) in your vagina for 6 weeks. -You may walk for exercise for the first 6 weeks then gradually return to your usual activities.   -Please do not drive for 1 week if you have no stitches and for 2 weeks if you have stitches or underwent a  delivery.    -You may take baths or shower per your preference.   -Please look at your bust (breasts) in the mirror daily and call provider for redness or tenderness or increased warmth. - If you have had a  please look at your incision daily as well and call provider for increasing redness or steady drainage from the incision.   -Please call your provider if temperature > 100.4*F or 38* C, worsening pain or a foul discharge. Johanna Zavala MD  OBGYN PGY-2  2023 9:37 AM     Attending/Teaching Physician Statement  I have participated in the care of this patient during this hospitalization and agree with the discharge summary.     Elysia Sosa,   23  12:50 PM

## 2023-09-23 NOTE — H&P
[facility] 41 y.o. female MRN: [id number]  Unit/Bed#: L&D 323-01 Encounter: [id number]    Assessment: 41 y.o.  at 39w3d admitted for labor vs IOL. SVE: 3.5/50/-3  FHT:  Baseline of 140/moderate variability/15 x 15 accels present/no decelerations. Category 1 tracing. Clinical EFW: 36th percentile  ; Cephalic confirmed by ultrasound  GBS status: negative       Plan:   Elevated blood pressure reading without diagnosis of hypertension  Assessment & Plan  Systolic (77GPN), NBQ:705 , Min:136 , HQX:272      Diastolic (98XGL), DIT:24, Min:69, Max:83      PreE Labs pending  P:C ratio: pending        39 weeks gestation of pregnancy  Assessment & Plan  Admit to OBGYN   Clear liquid diet   F/u T&S, CBC, RPR   IVF LR 125cc/hr   Continuous fetal monitoring and tocometry   Analgesia at maternal request   Vertex by TAUS  Induction plan: pitocin titration           Discussed case and plan w/ Dr. Christine Bowers      Chief Complaint: contractions    HPI: Anh Reynaga is a 41 y.o. [id number] with an DEBRA of 2023, by Ultrasound at 39w3d who initially presented to labor and delivery with a chief complaint of contractions. She was evaluated earlier in the day for the same complaint and was found to be unchanged on 2-hour recheck and was therefore sent home. Patient states that since going home she continued to feel contractions every 2 to 3 minutes. She denies any loss of fluid and endorses good fetal movement. She says she has noted some spotting but no gross vaginal bleeding. Upon evaluation in triage patient was noted to have an elevated blood pressure. Upon review of patient's other blood pressures this is her first elevated blood pressure in the pregnancy. However she did have additional elevated blood pressures here in triage all within a 4-hour timeframe. Patient is scheduled for induction on 2023 is currently 39 weeks 3 days.   Therefore the decision was made to proceed with preeclampsia testing and proceed with induction should the patient not make cervical change. Patient Active Problem List   Diagnosis   • 39 weeks gestation of pregnancy   • Obesity affecting pregnancy in second trimester   • BMI 40.0-44.9, adult ([address])   • Prior fetal macrosomia in first trimester, antepartum   • Susceptible varicella   • Uterine contractions   • Elevated blood pressure reading without diagnosis of hypertension       Baby complications/comments: none    Review of Systems   Constitutional: Negative for chills and fever. Respiratory: Negative for cough, shortness of breath and wheezing. Cardiovascular: Negative for chest pain and leg swelling. Gastrointestinal: Negative for abdominal pain, diarrhea, nausea and vomiting. Genitourinary: Negative for pelvic pain, vaginal bleeding and vaginal discharge. Musculoskeletal: Negative for back pain. Neurological: Negative for weakness, light-headedness and headaches.        OB Hx:  OB History    Para Term  AB Living   3 2 2 0 0 2   SAB IAB Ectopic Multiple Live Births   0 0 0 0 2      # Outcome Date GA Lbr Rahat/2nd Weight Sex Delivery Anes PTL Lv   3 Current            2 Term 20    F Vag-Spont   NO   1 Term 10/28/16 40w0d / 02:53 4139 g (9 lb 2 oz) F Vag-Spont EPI N NO       Past Medical Hx:  Past Medical History:   Diagnosis Date   • Asthma    • Hepatic adenoma    • Migraine        Past Surgical hx:  Past Surgical History:   Procedure Laterality Date   • LASIK     • WISDOM TOOTH EXTRACTION Bilateral        Social Hx:  Alcohol use: denies  Tobacco use: denies  Other substance use: denies    Allergies   Allergen Reactions   • Other Nasal Congestion     Seasonal   • Amoxicillin Rash       Medications Prior to Admission   Medication   • acetaminophen (TYLENOL) 325 mg tablet   • ferrous gluconate (FERGON) 324 mg tablet   • Prenatal Vit-Fe Fumarate-FA (PRENATAL VITAMIN PO)   • aspirin 81 mg chewable tablet       Objective:  Temp:  [98 °F (36.7 °C)] 98 °F (36.7 °C)  HR:  [83-84] 83  BP: (136-147)/(69-83) 140/79  There is no height or weight on file to calculate BMI. Physical Exam:  Physical Exam  Constitutional:       Appearance: Normal appearance. Cardiovascular:      Rate and Rhythm: Normal rate and regular rhythm. Pulmonary:      Effort: Pulmonary effort is normal. No respiratory distress. Abdominal:      Palpations: Abdomen is soft. Tenderness: There is no abdominal tenderness. Musculoskeletal:         General: No swelling or tenderness. Neurological:      General: No focal deficit present. Mental Status: She is alert and oriented to person, place, and time. Skin:     General: Skin is warm and dry. Vitals reviewed.               Lab Results   Component Value Date    WBC 10.33 (H) 06/20/2020    HGB 11.5 06/20/2020    HCT 35.7 06/20/2020     06/20/2020     No results found for: "NA", "K", "CL", "CO2", "BUN", "CREATININE", "GLUCOSE", "AST", "ALT"  Prenatal Labs: Reviewed      Blood type: B postive  Antibody: Negative  GBS: Negative  HIV: Non-reactive  Rubella: immune  Syphilis IgM/IgG: Non-reactive  HBsAg: Negative  HCAb: Negative  Chlamydia: Negative  Gonorrhea: Negative  Diabetes 1 hour screen: 141  3 hour glucose: 09,458,[phone]  Platelets: 566  Hgb: 11.7  >2 Midnights  INPATIENT     Signature/Title: Grabiel Fulton MD  Date: 9/23/2023  Time: 12:38 AM

## 2023-09-23 NOTE — ASSESSMENT & PLAN NOTE
Systolic (31BXL), SRW:066 , Min:119 , URX:874      Diastolic (60ZCY), AIM:98, Min:57, Max:97      PreE Labs wnl  P:C ratio: 0.64

## 2023-09-23 NOTE — PLAN OF CARE
Problem: ANTEPARTUM  Goal: Maintain pregnancy as long as maternal and/or fetal condition is stable  Description: INTERVENTIONS:  - Maternal surveillance  - Fetal surveillance  - Monitor uterine activity  - Medications as ordered  - Bedrest  Outcome: Progressing     Problem: BIRTH - VAGINAL/ SECTION  Goal: Fetal and maternal status remain reassuring during the birth process  Description: INTERVENTIONS:  - Monitor vital signs  - Monitor fetal heart rate  - Monitor uterine activity  - Monitor labor progression (vaginal delivery)  - DVT prophylaxis  - Antibiotic prophylaxis  Outcome: Progressing  Goal: Emotionally satisfying birthing experience for mother/fetus  Description: Interventions:  - Assess, plan, implement and evaluate the nursing care given to the patient in labor  - Advocate the philosophy that each childbirth experience is a unique experience and support the family's chosen level of involvement and control during the labor process   - Actively participate in both the patient's and family's teaching of the birth process  - Consider cultural, Temple and age-specific factors and plan care for the patient in labor  Outcome: Progressing     Problem: POSTPARTUM  Goal: Experiences normal postpartum course  Description: INTERVENTIONS:  - Monitor maternal vital signs  - Assess uterine involution and lochia  Outcome: Progressing  Goal: Appropriate maternal -  bonding  Description: INTERVENTIONS:  - Identify family support  - Assess for appropriate maternal/infant bonding   -Encourage maternal/infant bonding opportunities  - Referral to  or  as needed  Outcome: Progressing  Goal: Establishment of infant feeding pattern  Description: INTERVENTIONS:  - Assess breast/bottle feeding  - Refer to lactation as needed  Outcome: Progressing  Goal: Incision(s), wounds(s) or drain site(s) healing without S/S of infection  Description: INTERVENTIONS  - Assess and document dressing, incision, wound bed, drain sites and surrounding tissue  - Provide patient and family education  Outcome: Progressing

## 2023-09-23 NOTE — ANESTHESIA POSTPROCEDURE EVALUATION
Post-Op Assessment Note    CV Status:  Stable    Pain management: adequate     Mental Status:  Alert and awake   Hydration Status:  Euvolemic   PONV Controlled:  Controlled   Airway Patency:  Patent      Post Op Vitals Reviewed: Yes      Staff: Anesthesiologist     Post-op block assessment: catheter intact and no complications      No notable events documented.     BP      Temp      Pulse     Resp      SpO2      /63   Pulse 83   Temp 98.8 °F (37.1 °C) (Oral)   Resp 18   Ht 5' 6" (1.676 m)   Wt 134 kg (295 lb 6.7 oz)   Breastfeeding Unknown   BMI 47.68 kg/m²

## 2023-09-23 NOTE — OB LABOR/OXYTOCIN SAFETY PROGRESS
Oxytocin Safety Progress Check Note - Xenia Pearson 41 y.o. female MRN: [id number]    Unit/Bed#: L&D 323-01 Encounter: [id number]    Dose (india-units/min) Oxytocin: 14 india-units/min  Contraction Frequency (minutes): 3-6  Contraction Quality: Moderate  Tachysystole: No   Cervical Dilation: 3-4        Cervical Effacement: 50  Fetal Station: -3  Baseline Rate: 135 bpm  Fetal Heart Rate: 135 BPM  FHR Category:                Vital Signs:   Vitals:    09/23/23 0829   BP: 130/72   Pulse: 88   Resp:    Temp:        Notes/comments:    AROM for clear. SVE unchanged. Continue uptitrating pitocin.  D/w Dr Rocio Fonseca MD 9/23/2023 8:37 AM

## 2023-09-23 NOTE — ANESTHESIA PREPROCEDURE EVALUATION
Procedure:  LABOR ANALGESIA    Relevant Problems   CARDIO   (+) Preeclampsia      GYN   (+) 39 weeks gestation of pregnancy        Physical Exam    Airway    Mallampati score: II         Dental       Cardiovascular  Rhythm: regular, Rate: normal,     Pulmonary  Breath sounds clear to auscultation,     Other Findings        Anesthesia Plan  ASA Score- 3     Anesthesia Type- epidural with ASA Monitors. Additional Monitors:   Airway Plan:           Plan Factors-Exercise tolerance (METS): >4 METS. Chart reviewed. Existing labs reviewed. Patient summary reviewed. Patient is not a current smoker. Patient not instructed to abstain from smoking on day of procedure. Patient did not smoke on day of surgery. Obstructive sleep apnea risk education given perioperatively. Induction- intravenous. Postoperative Plan-     Informed Consent- Anesthetic plan and risks discussed with patient.

## 2023-09-23 NOTE — L&D DELIVERY NOTE
DELIVERY NOTE  Xenia Varela 41 y.o. female MRN: [id number]  Unit/Bed#: L&D 323-01 Encounter: [id number]    Obstetrician:    Dr. Mark Larios DO    Assistant:   Dr. Sugar Shah    Pre-Delivery Diagnosis:   1. IOL for PreE w/o SF  2. Obesity    Post-Delivery Diagnosis:   Same as above - Delivered    Procedure:  Spontaneous vaginal delivery    Specimens:   Cord blood obtained   Placenta; normal appearing, central insertion, intact   Arterial and venous blood gases (below)     Gases:  Umbilical Cord Venous Blood Gas:  Results from last 7 days   Lab Units 23  1150   PH COV  7.361   PCO2 COV mm HG 44.5*   HCO3 COV mmol/L 24.6   BASE EXC COV mmol/L -1.0*   O2 CT CD VB mL/dL 14.7   O2 HGB, VENOUS CORD % 26.3     Umbilical Cord Arterial Blood Gas:  Results from last 7 days   Lab Units 23  1150   PH COA  7.156*   PCO2 COA  72.5*   PO2 COA mm HG 24.2   HCO3 COA mmol/L 25.0   BASE EXC COA mmol/L -5.3*   O2 CONTENT CORD ART ml/dl 9.6   O2 HGB, ARTERIAL CORD % 47.3       Quantitative Blood Loss:   127 mL           Complications:    None    Brief Description of Labor Course:  Xenia Varela is a 41 y.o. [id number] who was admitted at 39w2d for IOL in the setting of PreE w/o SF. She was started on pitocin. She made continuous cervical change and received an epidural for pain control. She was AROM'd for clear moderate fluid. She proceeded to make cervical change and became complete and started pushing. Description of Delivery:   With the assistance of maternal expulsive efforts and gentle downward traction of the fetal head, the anterior (right) shoulder was delivered without difficulty, followed by the remainder of the infant's body and contralateral arm. Patient then delivered a viable male  at [id number] over intact perineum. A nuchal cord was not noted. After delivery of the , delayed clamping of the umbilical cord was undertaken for 30 seconds.  The  was noted to have good tone and cry spontaneously. There was no apparent injury to the . The cord was then doubly clamped and cut and the  was passed off to  staff for routine care. Umbilical cord blood and umbilical artery and venous gases were collected. Placenta was delivered at [phone] with fundal massage and gentle traction on the cord with active management of the third stage of labor. Placenta delivered intact with a 3-vessel cord. Active management of the third stage of labor was undertaken with IV pitocin at 250 milliunits/min. A bimanual exam was performed. Bleeding was noted to be under control.  Outcome:  Living  with APGARS 9, 9 at 1 and 5 minutes, respectively.  weight pending    Perineal Inspection  Inspection of the perineum, vagina, labia, cervix, and urethra revealed no lacerations. Conclusion:  Mother and baby are currently recovering nicely in stable condition. Attending Supervision:   Dr. Nasreen Pace DO was present for the entire procedure. Calvin Esparza MD  PGY-4 OB/GYN   2023 12:23 PM     Attending Physician/Surgeon Statement  I was present for and participated in all key aspects of this patient's care on 2023. I agree with the resident's documentation as stated above.     [facility]  23  11:04 AM

## 2023-09-24 VITALS
TEMPERATURE: 97.8 F | WEIGHT: 293 LBS | HEART RATE: 83 BPM | RESPIRATION RATE: 16 BRPM | HEIGHT: 66 IN | SYSTOLIC BLOOD PRESSURE: 121 MMHG | BODY MASS INDEX: 47.09 KG/M2 | OXYGEN SATURATION: 98 % | DIASTOLIC BLOOD PRESSURE: 73 MMHG

## 2023-09-24 PROCEDURE — NC001 PR NO CHARGE: Performed by: OBSTETRICS & GYNECOLOGY

## 2023-09-24 RX ORDER — DOCUSATE SODIUM 100 MG/1
100 CAPSULE, LIQUID FILLED ORAL 2 TIMES DAILY
Refills: 0
Start: 2023-09-24

## 2023-09-24 RX ORDER — IBUPROFEN 200 MG
600 TABLET ORAL EVERY 6 HOURS
Start: 2023-09-24

## 2023-09-24 RX ORDER — ACETAMINOPHEN 325 MG/1
650 TABLET ORAL EVERY 4 HOURS PRN
Refills: 0
Start: 2023-09-24

## 2023-09-24 RX ORDER — DIAPER,BRIEF,INFANT-TODD,DISP
1 EACH MISCELLANEOUS DAILY PRN
Refills: 0 | Status: CANCELLED
Start: 2023-09-24

## 2023-09-24 RX ADMIN — ACETAMINOPHEN 325MG 650 MG: 325 TABLET ORAL at 05:01

## 2023-09-24 RX ADMIN — IBUPROFEN 600 MG: 600 TABLET ORAL at 15:08

## 2023-09-24 RX ADMIN — ACETAMINOPHEN 325MG 650 MG: 325 TABLET ORAL at 11:37

## 2023-09-24 RX ADMIN — IBUPROFEN 600 MG: 600 TABLET ORAL at 00:35

## 2023-09-24 RX ADMIN — DOCUSATE SODIUM 100 MG: 100 CAPSULE, LIQUID FILLED ORAL at 09:12

## 2023-09-24 RX ADMIN — IBUPROFEN 600 MG: 600 TABLET ORAL at 06:36

## 2023-09-24 NOTE — PROGRESS NOTES
Progress Note - OB/GYN  Xenia Maldonado 41 y.o. female MRN: [id number]  Unit/Bed#: L&D 308-01 Encounter: [id number]    Assessment and Plan     Xenia Maldonado is a patient of: [facility]. She is PPD# 1 s/p  spontaneous vaginal delivery  Recovering well and is stable       Preeclampsia  Assessment & Plan  Systolic (36SFX), KNL:576 , Min:119 , EEO:491      Diastolic (24IRG), XPK:73, Min:57, Max:97      PreE Labs wnl  P:C ratio: 0.64        *  (spontaneous vaginal delivery)  Assessment & Plan  Recovering well   Encourage Ambulation  Encourage breastfeeding  GBS neg   Rh pos        Disposition    - Anticipate discharge home on PPD# 1 vs 2      Subjective/Objective     Chief Complaint: Postpartum State     Subjective:    Xenia Maldonado is PPD#1 s/p  spontaneous vaginal delivery. She has no current complaints. Pain is well controlled. Patient is currently voiding. She is ambulating. Patient is currently passing flatus and has had no bowel movement. She is tolerating PO, and denies nausea or vomitting. Patient denies fever, chills, chest pain, shortness of breath, or calf tenderness. Lochia is normal. She is  Bottle feeding. She is recovering well and is stable. She desires discharge today if she and baby are both cleared.          Vitals:   /71 (BP Location: Left arm)   Pulse 71   Temp 97.9 °F (36.6 °C) (Oral)   Resp 16   Ht 5' 6" (1.676 m)   Wt 134 kg (295 lb 6.7 oz)   SpO2 98%   Breastfeeding No   BMI 47.68 kg/m²       Intake/Output Summary (Last 24 hours) at 2023 0786  Last data filed at 2023 1701  Gross per 24 hour   Intake --   Output 1027 ml   Net -1027 ml       Invasive Devices     Peripheral Intravenous Line  Duration           Peripheral IV 23 Proximal;Right;Ventral (anterior) Forearm 1 day                Physical Exam:   GEN: [place] appears well, alert and oriented x 3, pleasant and cooperative   CARDIO: RRR, no murmurs or rubs  RESP:  CTAB, no wheezes or rales  ABDOMEN: soft, no tenderness, no distention, fundus @ U-2  EXTREMITIES: non tender, no erythema  Labs:     Hemoglobin   Date Value Ref Range Status   09/23/2023 10.9 (L) 11.5 - 15.4 g/dL Final   06/20/2020 11.5 11.5 - 15.4 g/dL Final     WBC   Date Value Ref Range Status   09/23/2023 12.41 (H) 4.31 - 10.16 Thousand/uL Final   06/20/2020 10.33 (H) 4.31 - 10.16 Thousand/uL Final     Platelets   Date Value Ref Range Status   09/23/2023 196 149 - 390 Thousands/uL Final   06/20/2020 226 149 - 390 Thousands/uL Final     Creatinine   Date Value Ref Range Status   09/23/2023 0.48 (L) 0.60 - 1.30 mg/dL Final     Comment:     Standardized to IDMS reference method     AST   Date Value Ref Range Status   09/23/2023 12 (L) 13 - 39 U/L Final     ALT   Date Value Ref Range Status   09/23/2023 11 7 - 52 U/L Final     Comment:     Specimen collection should occur prior to Sulfasalazine administration due to the potential for falsely depressed results.            Renay Morataya MD  9/24/2023  7:47 AM

## 2023-09-24 NOTE — PLAN OF CARE
Problem: ANTEPARTUM  Goal: Maintain pregnancy as long as maternal and/or fetal condition is stable  Description: INTERVENTIONS:  - Maternal surveillance  - Fetal surveillance  - Monitor uterine activity  - Medications as ordered  - Bedrest  Outcome: Progressing     Problem: BIRTH - VAGINAL/ SECTION  Goal: Fetal and maternal status remain reassuring during the birth process  Description: INTERVENTIONS:  - Monitor vital signs  - Monitor fetal heart rate  - Monitor uterine activity  - Monitor labor progression (vaginal delivery)  - DVT prophylaxis  - Antibiotic prophylaxis  Outcome: Progressing  Goal: Emotionally satisfying birthing experience for mother/fetus  Description: Interventions:  - Assess, plan, implement and evaluate the nursing care given to the patient in labor  - Advocate the philosophy that each childbirth experience is a unique experience and support the family's chosen level of involvement and control during the labor process   - Actively participate in both the patient's and family's teaching of the birth process  - Consider cultural, Alevism and age-specific factors and plan care for the patient in labor  Outcome: Progressing     Problem: POSTPARTUM  Goal: Experiences normal postpartum course  Description: INTERVENTIONS:  - Monitor maternal vital signs  - Assess uterine involution and lochia  Outcome: Progressing  Goal: Appropriate maternal -  bonding  Description: INTERVENTIONS:  - Identify family support  - Assess for appropriate maternal/infant bonding   -Encourage maternal/infant bonding opportunities  - Referral to  or  as needed  Outcome: Progressing  Goal: Establishment of infant feeding pattern  Description: INTERVENTIONS:  - Assess breast/bottle feeding  - Refer to lactation as needed  Outcome: Progressing  Goal: Incision(s), wounds(s) or drain site(s) healing without S/S of infection  Description: INTERVENTIONS  - Assess and document dressing, incision, wound bed, drain sites and surrounding tissue  - Provide patient and family education  Outcome: Progressing

## 2023-09-24 NOTE — PLAN OF CARE
Problem: ANTEPARTUM  Goal: Maintain pregnancy as long as maternal and/or fetal condition is stable  Description: INTERVENTIONS:  - Maternal surveillance  - Fetal surveillance  - Monitor uterine activity  - Medications as ordered  - Bedrest  2023 1518 by Cindy Metcalf RN  Outcome: Completed  2023 103 by Cinyd Metcalf RN  Outcome: Progressing     Problem: BIRTH - VAGINAL/ SECTION  Goal: Fetal and maternal status remain reassuring during the birth process  Description: INTERVENTIONS:  - Monitor vital signs  - Monitor fetal heart rate  - Monitor uterine activity  - Monitor labor progression (vaginal delivery)  - DVT prophylaxis  - Antibiotic prophylaxis  2023 1518 by Cindy Metcalf RN  Outcome: Completed  2023 103 by Cindy Metcalf RN  Outcome: Progressing  Goal: Emotionally satisfying birthing experience for mother/fetus  Description: Interventions:  - Assess, plan, implement and evaluate the nursing care given to the patient in labor  - Advocate the philosophy that each childbirth experience is a unique experience and support the family's chosen level of involvement and control during the labor process   - Actively participate in both the patient's and family's teaching of the birth process  - Consider cultural, Jehovah's witness and age-specific factors and plan care for the patient in labor  2023 1518 by Cindy Metcalf RN  Outcome: Completed  2023 103 by Cindy Metcalf RN  Outcome: Progressing     Problem: POSTPARTUM  Goal: Experiences normal postpartum course  Description: INTERVENTIONS:  - Monitor maternal vital signs  - Assess uterine involution and lochia  2023 1518 by Cindy Metcalf RN  Outcome: Completed  2023 103 by Cindy Metcalf RN  Outcome: Progressing  Goal: Appropriate maternal -  bonding  Description: INTERVENTIONS:  - Identify family support  - Assess for appropriate maternal/infant bonding   -Encourage maternal/infant bonding opportunities  - Referral to social worker or  as needed  9/24/2023 1518 by Nadege Corley RN  Outcome: Completed  9/24/2023 1039 by Nadege Corley RN  Outcome: Progressing  Goal: Establishment of infant feeding pattern  Description: INTERVENTIONS:  - Assess breast/bottle feeding  - Refer to lactation as needed  9/24/2023 1518 by Nadege Corley RN  Outcome: Completed  9/24/2023 1039 by Nadege Corley RN  Outcome: Progressing  Goal: Incision(s), wounds(s) or drain site(s) healing without S/S of infection  Description: INTERVENTIONS  - Assess and document dressing, incision, wound bed, drain sites and surrounding tissue  - Provide patient and family education  - Perform skin care/dressing changes every   9/24/2023 1518 by Nadege Corley RN  Outcome: Completed  9/24/2023 1039 by Nadege Corley RN  Outcome: Progressing

## 2023-09-24 NOTE — NURSING NOTE
Maternal/ discharge teaching complete. Pt verbalized understanding and denies any questions at this time. Pt encouraged to call for nursing staff if any questions come to mind prior to discharge. Save your life magnet and education packet given and reviewed.

## 2023-09-24 NOTE — PLAN OF CARE
Problem: ANTEPARTUM  Goal: Maintain pregnancy as long as maternal and/or fetal condition is stable  Description: INTERVENTIONS:  - Maternal surveillance  - Fetal surveillance  - Monitor uterine activity  - Medications as ordered  - Bedrest  Outcome: Progressing     Problem: BIRTH - VAGINAL/ SECTION  Goal: Fetal and maternal status remain reassuring during the birth process  Description: INTERVENTIONS:  - Monitor vital signs  - Monitor fetal heart rate  - Monitor uterine activity  - Monitor labor progression (vaginal delivery)  - DVT prophylaxis  - Antibiotic prophylaxis  Outcome: Progressing  Goal: Emotionally satisfying birthing experience for mother/fetus  Description: Interventions:  - Assess, plan, implement and evaluate the nursing care given to the patient in labor  - Advocate the philosophy that each childbirth experience is a unique experience and support the family's chosen level of involvement and control during the labor process   - Actively participate in both the patient's and family's teaching of the birth process  - Consider cultural, Congregational and age-specific factors and plan care for the patient in labor  Outcome: Progressing     Problem: POSTPARTUM  Goal: Experiences normal postpartum course  Description: INTERVENTIONS:  - Monitor maternal vital signs  - Assess uterine involution and lochia  Outcome: Progressing  Goal: Appropriate maternal -  bonding  Description: INTERVENTIONS:  - Identify family support  - Assess for appropriate maternal/infant bonding   -Encourage maternal/infant bonding opportunities  - Referral to  or  as needed  Outcome: Progressing  Goal: Establishment of infant feeding pattern  Description: INTERVENTIONS:  - Assess breast/bottle feeding  - Refer to lactation as needed  Outcome: Progressing  Goal: Incision(s), wounds(s) or drain site(s) healing without S/S of infection  Description: INTERVENTIONS  - Assess and document dressing, incision, wound bed, drain sites and surrounding tissue  - Provide patient and family education  - Perform skin care/dressing changes every  Outcome: Progressing

## 2024-01-15 ENCOUNTER — NEW PATIENT (OUTPATIENT)
Dept: URBAN - METROPOLITAN AREA CLINIC 6 | Facility: CLINIC | Age: 39
End: 2024-01-15

## 2024-01-15 DIAGNOSIS — Z01.00: ICD-10-CM

## 2024-01-15 DIAGNOSIS — Z98.890: ICD-10-CM

## 2024-01-15 DIAGNOSIS — H52.13: ICD-10-CM

## 2024-01-15 DIAGNOSIS — H52.203: ICD-10-CM

## 2024-01-15 PROCEDURE — 92015 DETERMINE REFRACTIVE STATE: CPT

## 2024-01-15 PROCEDURE — 92004 COMPRE OPH EXAM NEW PT 1/>: CPT

## 2024-01-15 ASSESSMENT — TONOMETRY
OS_IOP_MMHG: 12
OD_IOP_MMHG: 13

## 2024-01-15 ASSESSMENT — VISUAL ACUITY
OD_PH: 20/30
OU_CC: J1+
OS_SC: 20/30
OD_SC: 20/70

## 2025-05-29 ENCOUNTER — OCCMED (OUTPATIENT)
Dept: URGENT CARE | Facility: CLINIC | Age: 40
End: 2025-05-29

## 2025-05-29 ENCOUNTER — APPOINTMENT (OUTPATIENT)
Dept: RADIOLOGY | Facility: CLINIC | Age: 40
End: 2025-05-29
Attending: FAMILY MEDICINE
Payer: COMMERCIAL

## 2025-05-29 DIAGNOSIS — S99.912A ANKLE INJURY, LEFT, INITIAL ENCOUNTER: Primary | ICD-10-CM

## 2025-05-29 DIAGNOSIS — S99.912A ANKLE INJURY, LEFT, INITIAL ENCOUNTER: ICD-10-CM

## 2025-05-29 PROCEDURE — 73610 X-RAY EXAM OF ANKLE: CPT

## 2025-06-26 NOTE — PROGRESS NOTES
[id number]  [facility]: Ms. Kevin Bird was seen today for fetal growth and followup missed anatomy ultrasound. See ultrasound report under "OB Procedures" tab. The time spent on this established patient on the encounter date included 5 minutes previsit service time reviewing records and precharting, 5 minutes face-to-face service time counseling regarding results and coordinating care, and  4 minutes charting, totalling 14 minutes. Please don't hesitate to contact our office with any concerns or questions.   Hugo Flood MD Upper Endoscopy and Colonoscopy/Event Note